# Patient Record
Sex: FEMALE | Race: WHITE
[De-identification: names, ages, dates, MRNs, and addresses within clinical notes are randomized per-mention and may not be internally consistent; named-entity substitution may affect disease eponyms.]

---

## 2018-06-18 ENCOUNTER — HOSPITAL ENCOUNTER (EMERGENCY)
Dept: HOSPITAL 80 - FED | Age: 38
Discharge: HOME | End: 2018-06-18
Payer: COMMERCIAL

## 2018-06-18 VITALS — DIASTOLIC BLOOD PRESSURE: 78 MMHG | SYSTOLIC BLOOD PRESSURE: 125 MMHG

## 2018-06-18 DIAGNOSIS — F41.9: Primary | ICD-10-CM

## 2018-06-18 DIAGNOSIS — Z76.0: ICD-10-CM

## 2018-06-18 NOTE — EDPHY
H & P


Stated Complaint: Panic attack last night, out of ativan medication.


Time Seen by Provider: 06/18/18 07:45


HPI/ROS: 





CHIEF COMPLAINT:  Out of medication





HISTORY OF PRESENT ILLNESS:  This is a 37-year-old female, visiting [place] 

from [place] where she lives, who has history of anxiety for which she takes 

lorazepam 0.5 mg 3 times daily as needed.  She takes this most days.  She tells 

me that she did not bring enough medication with her on this trip and that she 

has not had any lorazepam for the last 2 days.  Last night she was unable to 

sleep and has been feeling increasingly more more anxious as the morning 

progresses.  She canceled her morning flight to return home because she did not 

feel she can get on airplane.  She tried to contact her physician in [place] 

but the office was not yet open, so she came to the emergency department.  She 

has rescheduled her flight for tomorrow.  She is requesting enough Ativan to 

get her back to [place] where she can follow up with her primary care physician 

and discuss alternative treatments for hang her anxiety.  She understands that 

she should not abruptly discontinue her Ativan.





REVIEW OF SYSTEMS:





A ten point review of systems was performed and is negative with the exception 

of the items mentioned in the HPI.





Past medical history:


1. Anxiety


2. Attention deficit hyperactivity disorder





Past surgical history:  Nasal septum repair








Social history:  She is here with her .  She works as a .  She 

does not use tobacco products.  She drinks alcohol on occasion.  No illicit 

drug use.





General Appearance:  Alert.  Vital signs reviewed.  Blood pressure 135/96 at 

triage.  


Eyes:  Pupils equal and round, no conjunctival injection, no discharge. 

Anicteric.


ENT, Mouth:  Mucous membranes are moist, no oropharyngeal erythema or edema.


Neck:  No lymphadenopathy, supple.


Respiratory:  Lungs are clear to auscultation; no wheezes, rales, or rhonchi.


Cardiovascular:  Regular rate and rhythm; no murmur, rub, or gallop.


Gastrointestinal:  Abdomen is soft and nontender, no masses or organomegaly, 

bowel sounds normal.


Skin:  Warm and dry, no rashes on exposed skin, normal color.


Back:  Nontender to palpation over the thoracolumbar spine. No CVAT.


Extremities:  No lower extremity edema, no calf tenderness or swelling.


Neurological:  Alert and oriented.  Moving all four extremities easily and 

equally.


Psychiatric:  Normal affect.





- Personal History


LMP (Females 10-55): Now


Current Tetanus Diphtheria and Acellular Pertussis (TDAP): Yes





- Medical/Surgical History


Hx Asthma: No


Hx Chronic Respiratory Disease: No


Hx Diabetes: No


Hx Cardiac Disease: No


Hx Renal Disease: No


Hx Cirrhosis: No


Hx Alcoholism: No


Hx HIV/AIDS: No


Hx Splenectomy or Spleen Trauma: No


Other PMH: Anxiety, ADHD, insomnia.





- Social History


Smoking Status: Never smoked


Constitutional: 


 Initial Vital Signs











Temperature (C)  36.6 C   06/18/18 07:33


 


Heart Rate  94   06/18/18 07:33


 


Respiratory Rate  18   06/18/18 07:33


 


Blood Pressure  135/96 H  06/18/18 07:33


 


O2 Sat (%)  97   06/18/18 07:33








 











O2 Delivery Mode               Room Air














Allergies/Adverse Reactions: 


 





Penicillins Allergy (Verified 06/18/18 07:38)


 








Home Medications: 














 Medication  Instructions  Recorded


 


Ativan  06/18/18


 


LORazepam [Ativan] 0.5 - 1 mg PO Q8 PRN #4 tab 06/18/18














Medical Decision Making


ED Course/Re-evaluation: 





She is given a dose of Ativan 0.5 mg in the emergency department.  She is given 

a prescription for Ativan 1 mg, dispense 4 tablets.  She states that she does 

not need an airline excuse.  I have not found evidence of any other illness, 

aside from her anxiety.


Differential Diagnosis: 





Considered a differential diagnosis that includes but is not limited to anxiety

, drug-seeking behavior, infection, ACS.





- Data Points


Medications Given: 


 








Discontinued Medications





Lorazepam (Ativan)  0.5 mg PO EDNOW ONE


   Stop: 06/18/18 07:55


   Last Admin: 06/18/18 07:58 Dose:  0.5 mg








Departure





- Departure


Disposition: Home, Routine, Self-Care


Clinical Impression: 


 Medication refill





Condition: Good


Instructions:  Medicine Refill (ED)


Additional Instructions: 


Use all medications as prescribed. Follow up with your regular doctor upon your 

return home. 


Referrals: 


Prabha Mackay MD [Medical Doctor] - As per Instructions


Prescriptions: 


LORazepam [Ativan] 0.5 - 1 mg PO Q8 PRN #4 tab


 PRN Reason: Anxiety